# Patient Record
(demographics unavailable — no encounter records)

---

## 2024-11-08 NOTE — DISCUSSION/SUMMARY
[FreeTextEntry1] : Vaccine administered by LPN, patient not examined by physician. Flu shot today.  [] : The components of the vaccine(s) to be administered today are listed in the plan of care. The disease(s) for which the vaccine(s) are intended to prevent and the risks have been discussed with the caretaker.  The risks are also included in the appropriate vaccination information statements which have been provided to the patient's caregiver.  The caregiver has given consent to vaccinate.

## 2024-12-05 NOTE — PHYSICAL EXAM

## 2024-12-05 NOTE — PHYSICAL EXAM

## 2024-12-05 NOTE — HISTORY OF PRESENT ILLNESS
[Fruit] : fruit [Vegetables] : vegetables [Meat] : meat [Eggs] : eggs [Dairy] : dairy [___ voids per day] : [unfilled] voids per day [Normal] : Normal [In own bed] : In own bed [Brushing teeth] : Brushing teeth [Yes] : Patient goes to dentist yearly [Playtime (60 min/d)] : Playtime 60 min a day [< 2 hrs of screen time] : Less than 2 hrs of screen time [Parent has appropriate responses to behavior] : Parent has appropriate responses to behavior [In ] : In  [No] : Not at  exposure [Water heater temperature set at <120 degrees F] : Water heater temperature set at <120 degrees F [Car seat in back seat] : Car seat in back seat [Carbon Monoxide Detectors] : Carbon monoxide detectors [Smoke Detectors] : Smoke detectors [Supervised outdoor play] : Supervised outdoor play [Exposure to electronic nicotine delivery system] : No exposure to electronic nicotine delivery system [LastFluorideTreatment] : 07/24

## 2024-12-05 NOTE — DISCUSSION/SUMMARY
[School Readiness] : school readiness [Mental Health] : mental health [Nutrition and Physical Activity] : nutrition and physical activity [Oral Health] : oral health [Safety] : safety [Anticipatory Guidance Given] : Anticipatory guidance addressed as per the history of present illness section [No Medications] : ~He/She~ is not on any medications [Mother] : mother [] : The components of the vaccine(s) to be administered today are listed in the plan of care. The disease(s) for which the vaccine(s) are intended to prevent and the risks have been discussed with the caretaker.  The risks are also included in the appropriate vaccination information statements which have been provided to the patient's caregiver.  The caregiver has given consent to vaccinate. [DTaP] : diptheria, tetanus and pertussis [IPV] : inactivated poliovirus [FreeTextEntry1] : Continue balanced diet with all food groups. Brush teeth twice a day with toothbrush. Recommend visit to dentist. As per car seat 's guidelines, use foward-facing booster seat until child reaches highest weight/height for seat. Child needs to ride in a belt-positioning booster seat until  4 feet 9 inches has been reached and are between 8 and 12 years of age. Put child to sleep in own bed. Help child to maintain consistent daily routines and sleep schedule.  discussed. Ensure home is safe. Teach child about personal safety. Use consistent, positive discipline. Read aloud to child. Limit screen time to no more than 2 hours per day. Return 1 year for routine well child check.

## 2025-04-09 NOTE — DISCUSSION/SUMMARY
[FreeTextEntry1] : Rapid strep: Negative, Culture sent Counseled mom probably viral in nature, likely flu  Warm salt water gargles mucinex for cough Nasal Saline spray every 4 hours for nasal congestion  Return to the office for worsening symptoms

## 2025-04-09 NOTE — HISTORY OF PRESENT ILLNESS
[de-identified] : THROAT PAIN [FreeTextEntry6] : fever since Sunday TMAX 102.6, throat pain, loss of appetite fever daily since sunday  vomiting 2-3 times  belly pain headache  alot of flu b in class

## 2025-04-10 NOTE — HISTORY OF PRESENT ILLNESS
[de-identified] : Diarrhea, vomiting, abdominal pain, fatigue, lightheaded, fever, coughing  [FreeTextEntry6] : Mother states pt has had vomiting for 3 days, diarrhea 3x since yesterday, Fever since Saturday, abdominal pain, fatigue, lack of appetite, coughing and feeling lightheaded. Mother has been administering Motrin.

## 2025-04-10 NOTE — HISTORY OF PRESENT ILLNESS
[de-identified] : Diarrhea, vomiting, abdominal pain, fatigue, lightheaded, fever, coughing  [FreeTextEntry6] : Mother states pt has had vomiting for 3 days, diarrhea 3x since yesterday, Fever since Saturday, abdominal pain, fatigue, lack of appetite, coughing and feeling lightheaded. Mother has been administering Motrin.

## 2025-04-10 NOTE — PHYSICAL EXAM
[Acute Distress] : no acute distress [Tired appearing] : tired appearing [Lethargic] : not lethargic [Toxic] : not toxic [Clear Rhinorrhea] : clear rhinorrhea [Normotonic] : normotonic [NL] : warm, clear